# Patient Record
Sex: MALE | Race: WHITE | NOT HISPANIC OR LATINO | Employment: FULL TIME | URBAN - METROPOLITAN AREA
[De-identification: names, ages, dates, MRNs, and addresses within clinical notes are randomized per-mention and may not be internally consistent; named-entity substitution may affect disease eponyms.]

---

## 2018-03-13 ENCOUNTER — OFFICE VISIT (OUTPATIENT)
Dept: URGENT CARE | Facility: CLINIC | Age: 23
End: 2018-03-13
Payer: OTHER GOVERNMENT

## 2018-03-13 VITALS
WEIGHT: 195 LBS | RESPIRATION RATE: 18 BRPM | HEART RATE: 66 BPM | TEMPERATURE: 99 F | BODY MASS INDEX: 23.75 KG/M2 | HEIGHT: 76 IN | DIASTOLIC BLOOD PRESSURE: 92 MMHG | OXYGEN SATURATION: 99 % | SYSTOLIC BLOOD PRESSURE: 153 MMHG

## 2018-03-13 DIAGNOSIS — F41.9 ANXIETY: Primary | ICD-10-CM

## 2018-03-13 LAB
GLUCOSE SERPL-MCNC: 83 MG/DL (ref 70–110)
POC ANION GAP: 17 MMOL/L (ref 10–20)
POC BUN: 11 MMOL/L (ref 8–26)
POC CHLORIDE: 102 MMOL/L (ref 98–109)
POC CREATININE: 0.9 MG/DL (ref 0.6–1.3)
POC HEMATOCRIT: 45 %PCV (ref 42–52)
POC HEMOGLOBIN: 15.3 G/DL (ref 13.5–18)
POC ICA: 1.23 MMOL/L (ref 1.12–1.32)
POC POTASSIUM: 4.5 MMOL/L (ref 3.5–4.9)
POC SODIUM: 140 MMOL/L (ref 138–146)
POC TCO2: 27 MMOL/L (ref 24–29)

## 2018-03-13 PROCEDURE — 99203 OFFICE O/P NEW LOW 30 MIN: CPT | Mod: S$GLB,,, | Performed by: NURSE PRACTITIONER

## 2018-03-13 PROCEDURE — 80047 BASIC METABLC PNL IONIZED CA: CPT | Mod: QW,S$GLB,, | Performed by: NURSE PRACTITIONER

## 2018-03-13 RX ORDER — SODIUM CHLORIDE 9 MG/ML
INJECTION, SOLUTION INTRAVENOUS
Status: COMPLETED | OUTPATIENT
Start: 2018-03-13 | End: 2018-03-13

## 2018-03-13 RX ADMIN — SODIUM CHLORIDE: 9 INJECTION, SOLUTION INTRAVENOUS at 07:03

## 2018-03-13 NOTE — LETTER
March 13, 2018      [facility]  [facility] [address] [facility] [address]  Phone: [phone]  Fax: [phone]       Patient: Yves Higgins   YOB: 1995  Date of Visit: 03/13/2018     Dear Yves Higgins,    You have been evaluated in the clinic today and we have discussed the following contract:   No-Suicide Contract   Patient has agreed that not to harm themself in any way, attempt suicide, or die by suicide.   Furthermore,they will take the following actions if they am ever suicidal:   1)They will remind themself that they can never, under any circumstances, harm themself in any way, attempt suicide, or die by suicide.   2) They will call 911 if they believe that they are in immediate danger of harming themself.   3) They will call any or all of the following numbers if the are not in immediate danger of harming themself but have suicidal thoughts:  -  [phone]:  [facility]   -  [phone]: [facility] Department of Pyschiatry  -  [phone] -- 24-hour suicide prevention line that can be called from anywhere in the [place]   4) They will continue talking on the phone with as many people as necessary for as long as necessary until the suicidal thoughts have subsided.   5) They have/have not considered/attempted/planned suicide in the past  6) They do / do not have access to firearms or weapons.    ___________________________________       _____________________________      Patient                                                                     Provider    __________________________________       Witness    Markus Rodríguez NP

## 2018-03-13 NOTE — PATIENT INSTRUCTIONS
No-Suicide Contract  (see signed letter for additional numbers)    Patient has agreed that not to harm themself in any way, attempt suicide, or die by suicide.   Furthermore,they will take the following actions if they am ever suicidal:   1)They will remind themself that they can never, under any circumstances, harm themself in any way, attempt suicide, or die by suicide.   2) They will call 911 if they believe that they are in immediate danger of harming themself.   3) They will call any or all of the following numbers if the are not in immediate danger of harming themself but have suicidal thoughts  [phone] -- 24-hour suicide prevention line that can be called from anywhere in the [place]   4) They will continue talking on the phone with as many people as necessary for as long as necessary until the suicidal thoughts have subsided.   5) They have/have not considered/attempted/planned suicide in the past  6) They do / do not have access to firearms or weapons.    General Instructions  Please return here or go to the Emergency Department for any concerns or worsening of condition.  As dicussed, avoid any alcohol, illicit drug use or food/drinks high in caffeine during this time.  Please follow up with your primary care doctor or specialist in the next 48-72hrs as needed.    If you  smoke, please stop smoking.  Anxiety Reaction  Anxiety is the feeling we all get when we think something bad might happen. It is a normal response to stress and usually causes only a mild reaction. When anxiety becomes more severe, it can interfere with daily life. In some cases, you may not even be aware of what it is youre anxious about. There may also be a genetic link or it may be a learned behavior in the home.  Both psychological and physical triggers cause stress reaction. It's often a response to fear or emotional stress, real or imagined. This stress may come from home, family, work, or social relationships.  During an  anxiety reaction, you may feel:  · Helpless  · Nervous  · Depressed  · Irritable  Your body may show signs of anxiety in many ways. You may experience:  · Dry mouth  · Shakiness  · Dizziness  · Weakness  · Trouble breathing  · Breathing fast (hyperventilating)  · Chest pressure  · Sweating  · Headache  · Nausea  · Diarrhea  · Tiredness  · Inability to sleep  · Sexual problems  Home care  · Try to locate the sources of stress in your life. They may not be obvious. These may include:  ¨ Daily hassles of life (traffic jams, missed appointments, car troubles, etc.)  ¨ Major life changes, both good (new baby, job promotion) and bad (loss of job, loss of loved one)  ¨ Overload: feeling that you have too many responsibilities and can't take care of all of them at once  ¨ Feeling helpless, feeling that your problems are beyond what youre able to solve  · Notice how your body reacts to stress. Learn to listen to your body signals. This will help you take action before the stress becomes severe.  · When you can, do something about the source of your stress. (Avoid hassles, limit the amount of change that happens in your life at one time and take a break when you feel overloaded).  · Unfortunately, many stressful situations can't be avoided. It is necessary to learn how to better manage stress. There are many proven methods that will reduce your anxiety. These include simple things like exercise, good nutrition and adequate rest. Also, there are certain techniques that are helpful:  ¨ Relaxation  ¨ Breathing exercises  ¨ Visualization  ¨ Biofeedback  ¨ Meditation  For more information about this, consult your doctor or go to a local bookstore and review the many books and tapes available on this subject.  Follow-up care  If you feel that your anxiety is not responding to self-help measures, contact your doctor or make an appointment with a counselor. You may need short-term psychological counseling and temporary medicine to  help you manage stress.  Call 911  Call your healthcare provider right away if any of these occur:  · Trouble breathing  · Confusion  · Drowsiness or trouble wakening  · Fainting or loss of consciousness  · Rapid heart rate  · Seizure  · New chest pain that becomes more severe, lasts longer, or spreads into your shoulder, arm, neck, jaw, or back  When to seek medical advice  Call your healthcare provider right away if any of these occur:  · Your symptoms get worse  · Severe headache not relieved by rest and mild pain reliever  Date Last Reviewed: 9/29/2015 © 2000-2017 [employer]. [address] [place] [id number]. All rights reserved. This information is not intended as a substitute for professional medical care. Always follow your healthcare professional's instructions.        Your Bodys Response to Anxiety    Normal anxiety is part of the bodys natural defense system. It's an alert to a threat that is unknown, vague, or comes from your own internal fears. While youre in this state, your feelings can range from a vague sense of worry to physical sensations such as a pounding heartbeat. These feelings make you want to react to the threat. An anxiety response is normal in many situations. But when you have an anxiety disorder, the same response can occur at the wrong times.  Anxiety can be helpful  Normal anxiety is a signal from your brain that warns you of a threat and is a normal response to help you prevent something or decrease the bad effects of something you can't control. For example, anxiety is a normal response to situations that might damage your body, separate you from a loved one, or lose your job. The symptoms of anxiety can be physical and mental.  How does it feel?  At certain times, people with anxiety may have:  · Dizziness  · Muscle tension or pain  · Restlessness  · Sleeplessness  · Trouble concentrating  · Racing heartbeat  · Fast breathing  · Shaking or  "trembling  · Stomachache  · Diarrhea  · Loss of energy  · Sweating  · Cold, clammy hands  · Chest pain  · Dry mouth  Anxiety can also be a problem  Anxiety can become a problem when it is hard to control, occurs for months, and interferes with important parts of your life. With an anxiety disorder, your body has the response described above, but in inappropriate ways. The response a person has depends on the anxiety disorder he or she has. With some disorders, the anxiety is way out of proportion to the threat that triggers it. With others, anxiety may occur even when there isnt a clear threat or trigger.  Who does it affect?  Some people are more prone to persistent anxiety than others. It tends to run in families, and it affects more younger people than older people, and more women than men. But no age, race, or gender is immune to anxiety problems.  Anxiety can be treated  The good news is that the anxiety thats disrupting your life can be treated. Check with your healthcare provider and rule out any physical problems that may be causing the anxiety symptoms. If an anxiety disorder is diagnosed seek mental healthcare. This is an illness and it can respond to treatment. Most types of anxiety disorders will respond to "talk therapy" and medicines. Working with your doctor or other healthcare provider, you can develop skills to help you cope with anxiety. You can also gain the perspective you need to overcome your fears. Note: Good sources of support or guidance can be found at your local hospital, mental health clinic, or an employee assistance program.  How to cope with anxiety  If anxiety is wearing you down, here are some things you can do to cope:  · Keep in mind that you cant control everything about a situation. Change what you can and let the rest take its course.  · Exercise--its a great way to relieve tension and help your body feel relaxed.  · Avoid caffeine and nicotine, which can make anxiety " symptoms worse.  · Fight the temptation to turn to alcohol or unprescribed drugs for relief. They only make things worse in the long run.  · Educate yourself about anxiety disorders. Keep track of helpful online resources and books you can use during stressful periods.  · Try stress management techniques such as meditation.  · Consider online or in-person support groups.   Date Last Reviewed: 1/1/2017  © 6291-7420 [employer]. [address] [place] [address], [place] [address]. All rights reserved. This information is not intended as a substitute for professional medical care. Always follow your healthcare professional's instructions.

## 2018-03-13 NOTE — PROGRESS NOTES
"Subjective:       Patient ID: Yves Higgins is a 22 y.o. male.    Vitals:  height is 6' 3.5" (1.918 m) and weight is 88.5 kg (195 lb). His temperature is 98.6 °F (37 °C). His blood pressure is 153/92 (abnormal) and his pulse is 66. His respiration is 18 and oxygen saturation is 99%.     Chief Complaint: Anxiety (3 days ago) and Palpitations    Pt is c/o having a anxiety attack for the past 2 days. Pt feels " impending doom". Pt says he has chills and he is constantly on edge. Pt is visiting from [place] and is down for spring break. Pt denied having a hx of panic attacks in the past or being on any type of medication for anxiety. Pt says had some heart palpitation. Pt says he can't focus and he asked people to repeat question 2 or 3 times before understanding. Pt denied any headaches or blurred vision. Pt stopped taking melxicam about a week ago because he ran out. Pt is in town until Saturday. Pt denied any increase stress in his life besides the semester work at college.       Other   This is a new problem. The current episode started in the past 7 days (3 days ago). The problem occurs constantly. The problem has been gradually worsening. Pertinent negatives include no abdominal pain, chest pain, chills, fever, headaches, nausea, rash, sore throat or vomiting.     Review of Systems   Constitution: Negative for chills and fever.   HENT: Negative for sore throat.    Eyes: Negative for blurred vision.   Cardiovascular: Negative for chest pain.   Respiratory: Negative for shortness of breath.    Skin: Negative for rash.   Musculoskeletal: Negative for back pain and joint pain.   Gastrointestinal: Negative for abdominal pain, diarrhea, nausea and vomiting.   Neurological: Negative for headaches.   Psychiatric/Behavioral: The patient is nervous/anxious.        Objective:      Physical Exam   Constitutional: He is oriented to person, place, and time. He appears well-developed and well-nourished. He is cooperative.  " Non-toxic appearance. He does not appear ill. He appears distressed.   Anxious appearance upon arrival.  Hands were shaky and clammy.    HENT:   Head: Normocephalic and atraumatic.   Right Ear: Hearing, tympanic membrane, external ear and ear canal normal.   Left Ear: Hearing, tympanic membrane, external ear and ear canal normal.   Nose: Nose normal. No mucosal edema, rhinorrhea or nasal deformity. No epistaxis. Right sinus exhibits no maxillary sinus tenderness and no frontal sinus tenderness. Left sinus exhibits no maxillary sinus tenderness and no frontal sinus tenderness.   Mouth/Throat: Uvula is midline, oropharynx is clear and moist and mucous membranes are normal. No trismus in the jaw. Normal dentition. No uvula swelling. No posterior oropharyngeal erythema.   Eyes: Conjunctivae and lids are normal. Right eye exhibits no discharge. Left eye exhibits no discharge. No scleral icterus.   Sclera clear bilat   Neck: Trachea normal, normal range of motion, full passive range of motion without pain and phonation normal. Neck supple.   Cardiovascular: Normal rate, regular rhythm, S1 normal, S2 normal, normal heart sounds, intact distal pulses and normal pulses.    Pulmonary/Chest: Effort normal and breath sounds normal. No respiratory distress.   Abdominal: Soft. Normal appearance and bowel sounds are normal. He exhibits no distension, no pulsatile midline mass and no mass. There is no hepatosplenomegaly. There is no tenderness.   Musculoskeletal: Normal range of motion. He exhibits no edema or deformity.   Neurological: He is alert and oriented to person, place, and time. He exhibits normal muscle tone. Coordination normal.   Skin: Skin is warm, dry and intact. He is not diaphoretic. No pallor.   Psychiatric: His speech is normal and behavior is normal. Judgment and thought content normal. His mood appears anxious. Thought content is not paranoid and not delusional. Cognition and memory are normal. He expresses no  homicidal and no suicidal ideation. He expresses no suicidal plans and no homicidal plans.   Patient reports he has no thoughts or plan of harming himself or others.     Nursing note and vitals reviewed.      Office Visit on 03/13/2018   Component Date Value Ref Range Status    POC Sodium 03/13/2018 140  138 - 146 MMOL/L Final    POC Potassium 03/13/2018 4.5  3.5 - 4.9 MMOL/L Final    POC Chloride 03/13/2018 102  98 - 109 MMOL/L Final    POC BUN 03/13/2018 11  8 - 26 MMOL/L Final    POC Glucose 03/13/2018 83  70 - 110 mg/dL Final    POC Creatinine 03/13/2018 0.9  0.6 - 1.3 mg/dL Final    POC iCA 03/13/2018 1.23  1.12 - 1.32 MMOL/L Final    POC TCO2 03/13/2018 27  24 - 29 MMOL/L Final    POC Hematocrit 03/13/2018 45  42 - 52 %PCV Final    POC Hemoglobin 03/13/2018 15.3  13.5 - 18 g/dL Final    POC Anion Gap 03/13/2018 17  10.0 - 20 MMOL/L Final     Assessment:       1. Anxiety        Plan:         Anxiety  -     EKG 12-lead  -     POCT Chemistry Panel (Manual)      Patient Instructions   No-Suicide Contract  (see signed letter for additional numbers)    Patient has agreed that not to harm themself in any way, attempt suicide, or die by suicide.   Furthermore,they will take the following actions if they am ever suicidal:   1)They will remind themself that they can never, under any circumstances, harm themself in any way, attempt suicide, or die by suicide.   2) They will call 911 if they believe that they are in immediate danger of harming themself.   3) They will call any or all of the following numbers if the are not in immediate danger of harming themself but have suicidal thoughts  [phone] -- 24-hour suicide prevention line that can be called from anywhere in the [place]   4) They will continue talking on the phone with as many people as necessary for as long as necessary until the suicidal thoughts have subsided.   5) They have/have not considered/attempted/planned suicide in the past  6) They do /  do not have access to firearms or weapons.    General Instructions  Please return here or go to the Emergency Department for any concerns or worsening of condition.  As dicussed, avoid any alcohol, illicit drug use or food/drinks high in caffeine during this time.  Please follow up with your primary care doctor or specialist in the next 48-72hrs as needed.    If you  smoke, please stop smoking.

## 2018-03-13 NOTE — PROGRESS NOTES
"22 year old who comes for evaluation of anxiety vs. Panic attack.  States he has been researching all night and his symptoms of fast heart rate, sweating, clammy hands and fear that something will happen all fit with these two medical conditions.  When asked if anything is going on recently, he reports that he is in his last semester of school and stressed out.  Also reports that his sister recently tried to kill herself on last week.  He further explained that she is currently in the process of being discharge from the [facility] unit.  Further expresses he was so shocked that his sister had a plan and went to the far limits of trying to wrap a  around her neck.      Patient states he has been stressed a lot lately with no appetite.  Reports that he only had one alcoholic drink today (Greek type white russian drink ).  Reports cocaine use on last night.  Denies any caffeine on today however reports that he usually drinks a lot of coffee.  Patient denies he has any thoughts to harm himself or others. He states he came here for help with his anxiety.  Again states that he "feels like something bad is going to happen to him".  Patient reports he is on spring break until Saturday.      Advised patient that we are here to help him. Discussed completing an EKG and Chemistry panel at today's visit.  Patient verbalized understanding and agrees with plan of care.      Consulted with Dr. Paniagua and Dr. Quispe of [facility] to discuss additional resources and a plan for this patient.  We agreed to review and have patient sign the "no Suicide Contract"; discuss available resources for support while in the city, avoid any alcohol or elicit drug use, support system while in town and steps to take in an emergency situation.  Also discussed with patient that his risks increased by staying in [place] in his current condition.  Patient states that he would be fine until Saturday when he leaves and that he has a " "trusted friend (Chase) who he totally trust that will keep him on the right path while inn town.  Reviewed safe activities within the city with the patient while also reiterated to avoid [address], alcohol and any elicit drug use especially cocaine use.      Reviewed "NO Suicide Contract with Patient" with MA in the room (see attached copy in chart).  Discussed with patient the importance of noticing signs of anxiety, what resources are available to him as well as to avoid elicit drug use and alcohol for the remainder of his vacation.  Patient's friend "Chase" also accompanied patient during this discussion as a support to this plan.  Patient verbalized understanding of all discussed and agreed with plan of care.       Post IV administration, laly expressed less anxiety while reported he felt better than he did when arriving to clinic. Patient denied any suicidal thoughts at this time and left clinic with his friend in [place].  "